# Patient Record
Sex: MALE
[De-identification: names, ages, dates, MRNs, and addresses within clinical notes are randomized per-mention and may not be internally consistent; named-entity substitution may affect disease eponyms.]

---

## 2019-02-22 ENCOUNTER — HOSPITAL ENCOUNTER (OUTPATIENT)
Dept: HOSPITAL 42 - SDS | Age: 69
Discharge: HOME | End: 2019-02-22
Attending: ORTHOPAEDIC SURGERY
Payer: MEDICARE

## 2019-02-22 VITALS — DIASTOLIC BLOOD PRESSURE: 78 MMHG | RESPIRATION RATE: 20 BRPM | SYSTOLIC BLOOD PRESSURE: 127 MMHG | HEART RATE: 78 BPM

## 2019-02-22 VITALS — BODY MASS INDEX: 273.5 KG/M2

## 2019-02-22 VITALS — TEMPERATURE: 98.1 F | OXYGEN SATURATION: 97 %
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
## 2019-02-22 DIAGNOSIS — S46.212A: Primary | ICD-10-CM

## 2019-02-22 PROCEDURE — 24341 RPR TDN/MUSC UPR A/E EACH: CPT

## 2019-02-22 RX ADMIN — BUPIVACAINE HYDROCHLORIDE ONE ML: 5 INJECTION, SOLUTION PERINEURAL at 09:48

## 2019-02-22 RX ADMIN — BUPIVACAINE HYDROCHLORIDE ONE ML: 5 INJECTION, SOLUTION PERINEURAL at 08:32

## 2019-02-22 NOTE — OP
PROCEDURE DATE:  02/22/2019



PREOPERATIVE DIAGNOSIS:  Left distal biceps tendon rupture.



POSTOPERATIVE DIAGNOSIS:  Left distal biceps tendon rupture.



PROCEDURE:  Left open biceps tendon repair.



SURGEON:  Silvano Sanchez MD



ASSISTANT:  Dr. Sanchez was assisted by Melanie Newton, the physician

assistant.  MsOsman Olivaresqamar was scrubbed and present throughout the entire case

and assisted in the patient positioning, retraction and wound closure.



ANESTHESIA:  General.



COMPLICATIONS:  None.



ESTIMATED BLOOD LOSS:  10 mL.



TOURNIQUET TIME:  80 minutes at 250 mmHg.



IMPLANT:  Arthrex button.



INDICATIONS FOR THE PROCEDURE:  This is a 69-year-old gentleman who

sustained an injury to his left elbow initially from a fall and then later

by lifting who developed pain in the left upper extremity.  Clinical

examination was consistent with a positive hook sign in the antecubital

fossa, pain and weakness resisted forearm supination.  MRI examination was

consistent with a full-thickness tear of his distal biceps tendon. 

Recommendations were for open repair.  The risks, benefits and alternatives

of the procedure were discussed with the patient including the possibility

of nerve damage and re-rupture, and informed consent was obtained.



OPERATIVE PROCEDURE:  After surgical site was signed and verified in the

preoperative holding area, the patient was taken to the operating room and

placed supine on the operating room table.  After administration of general

anesthesia, the patient received 2 g of Ancef IV.  Tourniquet was placed

about the left arm.  Care was taken to make sure all bony prominences and

nerves were well padded and protected.  The left upper extremity was

prepped and draped in the usual sterile fashion.  The bony landmarks were

identified about the proximal forearm using the C-arm, and approximately a

4-cm horizontal incision was made over the radial bicipital tuberosity. 

Soft tissues were dissected bluntly.  Care was taken to retract any

cutaneous nerves, and dissection was carried down to the bicipital

tuberosity keeping the forearm in supination.  At this point, some seroma

was appreciated and evacuated.  The biceps tendon could be seen just

proximal to the tuberosity.  Part of it appeared to be still intact, and

the distal end appeared to be significantly degenerative and bulbous.  At

this point, the intact fibers of the tendon were transected and the tendon

could then be easily delivered out of the incision.  The free end of the

biceps tendon was then debrided to allow for an 8-mm tunnel and down to

healthy-appearing tissue.  Using an Arthrex FiberLoop, the distal biceps

tendon was whipstitched.  The sutures were passed through the holes in the

button.  At this point, Arthrex was backed down to the wound.  Again, soft

tissue was actually buttoned out to the bicipital tuberosity, and the guide

pin for our bicortical hole was placed in the bicipital tuberosity.  The

position of the guide pin was confirmed using the C-arm.  Satisfied, the

hole was then drilled bicortically.  With the guide pin still in place, a

unicortical hole was placed using an 8-mm reamer.  The wound was then

copiously irrigated with antibiotic saline solution.  At this point, the

button was inserted into the socket and deployed on the far cortex.  At

this point using the sutures in the button, the tendon was recessed into

the socket.  Once this was done, the loop was closed by placing several

knots.  Finally using a free needle, one end of the suture was then passed

through the tendon and then tied.  At this point, the tendon appeared to be

fully recessed into the socket.  The position of the button was confirmed

using the C-arm.  Satisfied and due to the strong fixation, the decision

was made not to put in interference screws.  The wound was irrigated and

the skin was closed in a layered fashion.  A sterile dressing was applied,

and a posterior splint was placed.  The patient was awakened from the

procedure, taken to the recovery room in stable condition.







__________________________________________

Silvano Sanchez MD





DD:  02/22/2019 9:44:58

DT:  02/22/2019 16:13:27

Job # [id number]

## 2019-02-22 NOTE — RAD
Date of service: 



02/22/2019



PROCEDURE:  Fluoroscopy up to 1 hr



HISTORY:

BICEPS TENDON REPAIR



COMPARISON:





TECHNIQUE:

Fluoro time 12.5 sec.  Cumulative dose 0.45 mGy.  Four images were 

submitted



FINDINGS:

There is a small orthopedic anchor device at the insertion of the 

biceps tendon



IMPRESSION:

As above

## 2019-02-22 NOTE — [id number].[id number]
Surgeon's Initial Post Op Note





- Surgeon's Notes


Surgeon: SUSHANT Sanchez MD


Assistant: CHAD Newton PA-C


Type of Anesthesia: General Endo


Anesthesia Administered By: Dr. Butcher


Pre-Operative Diagnosis: left distal biceps tendon tear


Operative Findings: see full note


Post-Operative Diagnosis: same


Operation Performed: open left distal biceps tendon tear


Specimen/Specimens Removed: none


Estimated Blood Loss: EBL {In ML}: 10


Blood Products Given: N/A


Drains Used: No Drains


Post-Op Condition: Fair


Date of Surgery/Procedure: 02/22/19


Time of Surgery/Procedure: 09:52